# Patient Record
Sex: MALE | Race: WHITE | NOT HISPANIC OR LATINO | Employment: FULL TIME | URBAN - METROPOLITAN AREA
[De-identification: names, ages, dates, MRNs, and addresses within clinical notes are randomized per-mention and may not be internally consistent; named-entity substitution may affect disease eponyms.]

---

## 2019-06-23 ENCOUNTER — HOSPITAL ENCOUNTER (EMERGENCY)
Facility: OTHER | Age: 49
Discharge: HOME | End: 2019-06-23
Attending: EMERGENCY MEDICINE

## 2019-06-23 VITALS
HEART RATE: 97 BPM | DIASTOLIC BLOOD PRESSURE: 79 MMHG | WEIGHT: 171.31 LBS | TEMPERATURE: 98 F | RESPIRATION RATE: 18 BRPM | BODY MASS INDEX: 24.52 KG/M2 | OXYGEN SATURATION: 100 % | HEIGHT: 70 IN | SYSTOLIC BLOOD PRESSURE: 129 MMHG

## 2019-06-23 DIAGNOSIS — L03.113 CELLULITIS OF RIGHT UPPER EXTREMITY: Primary | ICD-10-CM

## 2019-06-23 DIAGNOSIS — M25.532 LEFT WRIST PAIN: ICD-10-CM

## 2019-06-23 DIAGNOSIS — Z78.9 DAILY CONSUMPTION OF ALCOHOL: ICD-10-CM

## 2019-06-23 PROCEDURE — 90715 TDAP VACCINE 7 YRS/> IM: CPT | Performed by: EMERGENCY MEDICINE

## 2019-06-23 PROCEDURE — 25000003 PHARM REV CODE 250: Performed by: EMERGENCY MEDICINE

## 2019-06-23 PROCEDURE — 63600175 PHARM REV CODE 636 W HCPCS: Performed by: EMERGENCY MEDICINE

## 2019-06-23 PROCEDURE — 90471 IMMUNIZATION ADMIN: CPT | Performed by: EMERGENCY MEDICINE

## 2019-06-23 PROCEDURE — 99284 EMERGENCY DEPT VISIT MOD MDM: CPT

## 2019-06-23 RX ORDER — MUPIROCIN 20 MG/G
OINTMENT TOPICAL 3 TIMES DAILY
Qty: 15 G | Refills: 0 | Status: SHIPPED | OUTPATIENT
Start: 2019-06-23 | End: 2019-07-03

## 2019-06-23 RX ORDER — MUPIROCIN 20 MG/G
1 OINTMENT TOPICAL
Status: COMPLETED | OUTPATIENT
Start: 2019-06-23 | End: 2019-06-23

## 2019-06-23 RX ORDER — NAPROXEN 500 MG/1
500 TABLET ORAL
Status: COMPLETED | OUTPATIENT
Start: 2019-06-23 | End: 2019-06-23

## 2019-06-23 RX ORDER — CLINDAMYCIN HYDROCHLORIDE 150 MG/1
300 CAPSULE ORAL EVERY 6 HOURS
Qty: 56 CAPSULE | Refills: 0 | Status: SHIPPED | OUTPATIENT
Start: 2019-06-23 | End: 2019-06-30

## 2019-06-23 RX ORDER — SULFAMETHOXAZOLE AND TRIMETHOPRIM 800; 160 MG/1; MG/1
1 TABLET ORAL
Status: DISCONTINUED | OUTPATIENT
Start: 2019-06-23 | End: 2019-06-23

## 2019-06-23 RX ORDER — NAPROXEN 500 MG/1
500 TABLET ORAL 2 TIMES DAILY PRN
Qty: 60 TABLET | Refills: 0 | Status: SHIPPED | OUTPATIENT
Start: 2019-06-23

## 2019-06-23 RX ORDER — CLINDAMYCIN HYDROCHLORIDE 150 MG/1
450 CAPSULE ORAL
Status: COMPLETED | OUTPATIENT
Start: 2019-06-23 | End: 2019-06-23

## 2019-06-23 RX ADMIN — NAPROXEN 500 MG: 500 TABLET ORAL at 07:06

## 2019-06-23 RX ADMIN — CLOSTRIDIUM TETANI TOXOID ANTIGEN (FORMALDEHYDE INACTIVATED), CORYNEBACTERIUM DIPHTHERIAE TOXOID ANTIGEN (FORMALDEHYDE INACTIVATED), BORDETELLA PERTUSSIS TOXOID ANTIGEN (GLUTARALDEHYDE INACTIVATED), BORDETELLA PERTUSSIS FILAMENTOUS HEMAGGLUTININ ANTIGEN (FORMALDEHYDE INACTIVATED), BORDETELLA PERTUSSIS PERTACTIN ANTIGEN, AND BORDETELLA PERTUSSIS FIMBRIAE 2/3 ANTIGEN 0.5 ML: 5; 2; 2.5; 5; 3; 5 INJECTION, SUSPENSION INTRAMUSCULAR at 07:06

## 2019-06-23 RX ADMIN — MUPIROCIN 22 G: 20 OINTMENT TOPICAL at 07:06

## 2019-06-23 RX ADMIN — CLINDAMYCIN HYDROCHLORIDE 450 MG: 150 CAPSULE ORAL at 07:06

## 2019-06-23 NOTE — ED PROVIDER NOTES
Encounter Date: 6/23/2019       History     Chief Complaint   Patient presents with    Abscess     Pt came to the ED tonight c.o abscess located on the right forearm x 4 days. redness and swelling noted to the area     HPI   Patient presents for evaluation of erythematous and edematous area at his right forearm.  No clear inciting factor.  At onset patient noted appeared to be a very small pimple which gradually had worsening erythema and became fluctuant without active drainage. About 1 day ago, the patient performed an incision and drainage at home by cutting in to these center of the fluctuant area with a knife that had been heated to a high temperature.  He was able to express a small amount of white pus.  Surrounding erythema and edema initially improved, but over the course of the day and night erythema spread around the initial site.  He denies any associated fevers, chills, focal deficits.  He denies any obvious injury prior to onset at.  He denies other similar lesions.  Patient also presents for evaluation of left wrist pain for the past 3 months since fall onto outstretched hand while skateboarding.  Pain is fairly constant, worse with certain movements and positions particularly flexion and extension of the wrist.  He has not taken any analgesics for this.  He denies any recent swelling. He has not been evaluated for this problem.  History provided by the patient, medical records.  Review of patient's allergies indicates:  No Known Allergies  History reviewed. No pertinent past medical history.  History reviewed. No pertinent surgical history.  History reviewed. No pertinent family history.  Social History     Tobacco Use    Smoking status: Never Smoker   Substance Use Topics    Alcohol use: Yes    Drug use: Yes     Types: Marijuana     Review of Systems  ROS: As per HPI and below:   General: No fever. No chills. No generalized weakness.   HENT: No sore throat.   Eyes: No visual changes. No eye pain.  "  Cardiovascular: No chest pain.   Respiratory: No dyspnea. No cough.   GI: No abdominal pain. No nausea. No vomiting. No diarrhea.   : No dysuria. No hematuria.   Skin:  Erythema, edema, drainage  Neuro: No focal weakness. No headache. No syncope.  Musculoskeletal: Notes extremity pain. No swelling.    Psych: No acute changes.  Daily alcohol use.  Reports 6 alcoholic drinks in the last approximately 8 hr  All other systems negative.     Physical Exam     Initial Vitals [06/23/19 0602]   BP Pulse Resp Temp SpO2   134/75 107 16 98.2 °F (36.8 °C) 100 %      MAP       --         Physical Exam  /79 (BP Location: Right arm, Patient Position: Sitting)   Pulse 97   Temp 98.3 °F (36.8 °C) (Oral)   Resp 18   Ht 5' 10" (1.778 m)   Wt 77.7 kg (171 lb 4.8 oz)   SpO2 100%   BMI 24.58 kg/m²     Nursing note and vitals reviewed.  Constitutional: AAOx3. Well appearing.   Eyes: EOMI. No discharge. Anicteric.  HENT:   Mouth/Throat: Oropharynx is clear and moist. Uvula midline.   Neck: Normal range of motion. Neck supple.    Cardiovascular: Normal rate  Pulmonary/Chest: No respiratory distress.   Abdominal: No distension   Musculoskeletal:     Left Wrist: Skin intact. 2+ pulses, cap refill <2sec. FROM at shoulder/elbow. wrist ROM is full. 5/5 Flexion and extension, finger flexion / extension, finger abduction / adduction, thumb opposition. No snuffbox tenderness. No point tenderness. Light touch intact in median / radial / ulnar distributions. Compartments soft.       Neurological: GCS15. Alert and oriented to person, place, and time. No gross focal strength or light touch deficit.  No slurred speech.  Skin: Skin is warm and dry.   Approximately 10 cm area of erythema, edema surrounding central lesion at the volar mid right forearm          Psychiatric:  Mild psychomotor agitation.  Very pleasant.  Behavior is normal. Judgment normal.    ED Course   Procedures  Labs Reviewed - No data to display       Imaging Results  "   None                       Attending Attestation:             Attending ED Notes:   Patient is a 49-year-old male with daily alcohol use who presents with erythematous, edematous lesion at his right forearm.  Patient attempted incision and drainage at home, however area of erythema has continued to worsen.  On exam, patient has a approximately 10 cm across area of edema and erythema with central lesion.  Bedside ultrasound shows cobblestoning, no involvement of deep tissues, no focal fluid collection amenable to incision and drainage. Patient also complained of left wrist pain since fall onto outstretched hand while skateboarding about 3 months ago.  On exam patient has full range of motion, is neurovascularly intact, has no snuffbox tenderness. I doubt acute fracture however he may have a remote fracture from the original injury.  Patient was given Tdap, initial dose of empiric antibiotics for what appears to be clinical cellulitis.  He was instructed to follow up with PCP for likely physical therapy versus orthopedics referral for his left wrist pain. Strict return precautions given.  I discussed with patient and/or guardian/caretaker that this evaluation in the ED does not suggest any emergent or life threatening medical condition requiring admission or immediate intervention beyond what was provided in the ED. Regardless, an unremarkable evaluation in the ED does not preclude the development or presence of a serious or life threatening condition. As such, patient was instructed to return immediately for any worsening or change in current symptoms.     I had a detailed discussion with patient  and/or guardian/caretaker regarding findings, plan, return precautions, importance of medication adherence, need to follow-up with a PCP and specialist. All questions answered.     Note was created using voice recognition software. Note may have occasional typographical errors that may not have been identified and edited  despite initial review prior to signing.              Clinical Impression:       ICD-10-CM ICD-9-CM   1. Cellulitis of right upper extremity L03.113 682.3   2. Daily consumption of alcohol Z78.9 V49.89   3. Left wrist pain M25.532 719.43                                Mc Ying MD  06/23/19 0747

## 2019-06-23 NOTE — DISCHARGE INSTRUCTIONS
Call your primary care doctor to make the first available appointment.     Keep all your medical appointments.     Take your regular medication as prescribed. Contact your primary care provider before running out of medication, or for any problems obtaining them.    Do not drive or operate heavy machinery while taking opioid or muscle relaxing medications. Examples include norco, percocet, xanax, valium, flexeril.     Overuse or long term use of pain and sedating medication may lead to addiction, dependence, organ failure, family and work problems, legal problems, accidental overdose and death.    If you do not have health insurance, you probably qualify for heavily discounted rates:  Call [phone] ([facility] hotline) or go to www.[employer].[place].gov    Your evaluation in the ED does not suggest any emergent or life threatening medical condition requiring admission or immediate intervention beyond that provided in the ED.   However, the signs of a serious problem sometimes take more time to appear.   RETURN TO THE ER if any of the following occur:    Weakness, dizziness, fainting, or loss of consciousness   Fever of 100.4ºF (38ºC) or higher  Any worse symptoms  Any new or concerning symptoms

## 2019-06-23 NOTE — ED TRIAGE NOTES
"Pt arrives to ED with c/o abscess to right forearm. Pt states "a couple of days ago I squeezed what looked like a pimple and there was this white stuff that came out, then yesterday I sterilized my pocket knife and paper looking white stuff came out and not its just getting bigger. It feels warm." redness and swelling noted to pts right forearm, pt denies pain to area, 1 cm scab noted to area. Pt lying in bed, respirations even, unlabored, eyes open spontaneously, NAD noted, AAOX4, pt reports drinking.   "